# Patient Record
Sex: FEMALE | Race: WHITE | URBAN - METROPOLITAN AREA
[De-identification: names, ages, dates, MRNs, and addresses within clinical notes are randomized per-mention and may not be internally consistent; named-entity substitution may affect disease eponyms.]

---

## 2021-12-22 ENCOUNTER — OFFICE (OUTPATIENT)
Dept: URBAN - METROPOLITAN AREA CLINIC 79 | Facility: CLINIC | Age: 26
End: 2021-12-22

## 2021-12-22 VITALS
DIASTOLIC BLOOD PRESSURE: 64 MMHG | WEIGHT: 188 LBS | SYSTOLIC BLOOD PRESSURE: 92 MMHG | HEART RATE: 87 BPM | HEIGHT: 65 IN | TEMPERATURE: 97.4 F

## 2021-12-22 DIAGNOSIS — K92.1 MELENA: ICD-10-CM

## 2021-12-22 PROCEDURE — 00031: CPT | Performed by: INTERNAL MEDICINE

## 2021-12-22 PROCEDURE — 99204 OFFICE O/P NEW MOD 45 MIN: CPT | Performed by: INTERNAL MEDICINE

## 2022-01-10 ENCOUNTER — OFFICE (OUTPATIENT)
Dept: URBAN - METROPOLITAN AREA CLINIC 34 | Facility: CLINIC | Age: 27
End: 2022-01-10
Payer: COMMERCIAL

## 2022-01-10 DIAGNOSIS — Z11.59 ENCOUNTER FOR SCREENING FOR OTHER VIRAL DISEASES: ICD-10-CM

## 2022-01-10 PROCEDURE — G2023 SPECIMEN COLLECT COVID-19: HCPCS | Performed by: INTERNAL MEDICINE

## 2022-01-13 ENCOUNTER — OFFICE (OUTPATIENT)
Dept: URBAN - METROPOLITAN AREA CLINIC 98 | Facility: CLINIC | Age: 27
End: 2022-01-13

## 2022-01-13 VITALS
WEIGHT: 185 LBS | HEART RATE: 89 BPM | HEART RATE: 78 BPM | OXYGEN SATURATION: 100 % | DIASTOLIC BLOOD PRESSURE: 68 MMHG | HEART RATE: 73 BPM | SYSTOLIC BLOOD PRESSURE: 98 MMHG | RESPIRATION RATE: 20 BRPM | DIASTOLIC BLOOD PRESSURE: 62 MMHG | TEMPERATURE: 97.5 F | TEMPERATURE: 97.9 F | OXYGEN SATURATION: 98 % | SYSTOLIC BLOOD PRESSURE: 104 MMHG | DIASTOLIC BLOOD PRESSURE: 58 MMHG | RESPIRATION RATE: 14 BRPM | SYSTOLIC BLOOD PRESSURE: 109 MMHG | RESPIRATION RATE: 17 BRPM | RESPIRATION RATE: 16 BRPM | DIASTOLIC BLOOD PRESSURE: 76 MMHG | HEIGHT: 65 IN | OXYGEN SATURATION: 99 % | SYSTOLIC BLOOD PRESSURE: 107 MMHG | HEART RATE: 77 BPM | HEART RATE: 92 BPM | SYSTOLIC BLOOD PRESSURE: 115 MMHG | HEART RATE: 80 BPM | SYSTOLIC BLOOD PRESSURE: 122 MMHG | RESPIRATION RATE: 18 BRPM

## 2022-01-13 DIAGNOSIS — K60.1 CHRONIC ANAL FISSURE: ICD-10-CM

## 2022-01-13 DIAGNOSIS — K92.1 MELENA: ICD-10-CM

## 2022-01-13 DIAGNOSIS — K63.5 POLYP OF COLON: ICD-10-CM

## 2022-01-13 LAB
GI LOWER POLYPECTOMY EXCISION - SPECM 1 JAR(S): 1: (no result)
GI LOWER POLYPECTOMY EXCISION - SPECM 1 JAR(S): 1: PDF REPORT: (no result)

## 2022-01-24 ENCOUNTER — APPOINTMENT (RX ONLY)
Dept: URBAN - METROPOLITAN AREA CLINIC 371 | Facility: CLINIC | Age: 27
Setting detail: DERMATOLOGY
End: 2022-01-24

## 2022-01-24 DIAGNOSIS — Q819 OTHER SPECIFIED ANOMALIES OF SKIN: ICD-10-CM

## 2022-01-24 DIAGNOSIS — Q826 OTHER SPECIFIED ANOMALIES OF SKIN: ICD-10-CM

## 2022-01-24 DIAGNOSIS — L70.0 ACNE VULGARIS: ICD-10-CM | Status: INADEQUATELY CONTROLLED

## 2022-01-24 DIAGNOSIS — L40.8 OTHER PSORIASIS: ICD-10-CM

## 2022-01-24 DIAGNOSIS — Q828 OTHER SPECIFIED ANOMALIES OF SKIN: ICD-10-CM

## 2022-01-24 PROBLEM — L85.8 OTHER SPECIFIED EPIDERMAL THICKENING: Status: ACTIVE | Noted: 2022-01-24

## 2022-01-24 PROCEDURE — ? COUNSELING

## 2022-01-24 PROCEDURE — 99204 OFFICE O/P NEW MOD 45 MIN: CPT

## 2022-01-24 PROCEDURE — ? ORDER TESTS

## 2022-01-24 PROCEDURE — ? MEDICATION COUNSELING

## 2022-01-24 PROCEDURE — ? PRESCRIPTION

## 2022-01-24 PROCEDURE — ? TREATMENT REGIMEN

## 2022-01-24 RX ORDER — CICLOPIROX 10 MG/.96ML
SHAMPOO TOPICAL TIW
Qty: 120 | Refills: 3 | Status: ERX | COMMUNITY
Start: 2022-01-24

## 2022-01-24 RX ORDER — DAPSONE 75 MG/G
GEL TOPICAL
Qty: 90 | Refills: 5 | Status: ERX | COMMUNITY
Start: 2022-01-24

## 2022-01-24 RX ORDER — SPIRONOLACTONE 50 MG/1
TABLET, FILM COATED ORAL
Qty: 60 | Refills: 3 | Status: ACTIVE

## 2022-01-24 RX ORDER — HALOBETASOL PROPIONATE 0.5 MG/G
AEROSOL, FOAM TOPICAL
Qty: 50 | Refills: 2 | Status: ERX | COMMUNITY
Start: 2022-01-24

## 2022-01-24 RX ORDER — TRIFAROTENE 50 UG/G
CREAM TOPICAL
Qty: 45 | Refills: 3 | Status: ERX | COMMUNITY
Start: 2022-01-24

## 2022-01-24 RX ADMIN — HALOBETASOL PROPIONATE: 0.5 AEROSOL, FOAM TOPICAL at 00:00

## 2022-01-24 RX ADMIN — TRIFAROTENE: 50 CREAM TOPICAL at 00:00

## 2022-01-24 RX ADMIN — CICLOPIROX: 10 SHAMPOO TOPICAL at 00:00

## 2022-01-24 RX ADMIN — DAPSONE: 75 GEL TOPICAL at 00:00

## 2022-01-24 ASSESSMENT — LOCATION SIMPLE DESCRIPTION DERM
LOCATION SIMPLE: LEFT POSTERIOR UPPER ARM
LOCATION SIMPLE: LEFT CHEEK
LOCATION SIMPLE: RIGHT POSTERIOR UPPER ARM
LOCATION SIMPLE: SCALP
LOCATION SIMPLE: RIGHT CHEEK

## 2022-01-24 ASSESSMENT — LOCATION DETAILED DESCRIPTION DERM
LOCATION DETAILED: LEFT DISTAL POSTERIOR UPPER ARM
LOCATION DETAILED: RIGHT DISTAL POSTERIOR UPPER ARM
LOCATION DETAILED: RIGHT SUPERIOR PARIETAL SCALP
LOCATION DETAILED: LEFT INFERIOR CENTRAL MALAR CHEEK
LOCATION DETAILED: RIGHT INFERIOR CENTRAL MALAR CHEEK

## 2022-01-24 ASSESSMENT — LOCATION ZONE DERM
LOCATION ZONE: SCALP
LOCATION ZONE: FACE
LOCATION ZONE: ARM

## 2022-01-24 NOTE — PROCEDURE: MEDICATION COUNSELING
Discussion/Summary   Hi Sharona,      I tried to call you to review your blood work but your voice mail box was full. Overall everything looks good! Your electrolytes, cholesterol, thyroid, kidney and liver function are normal. Your vitamin D level was slightly low at 28.4, the normal range is  but I find people feel better when levels are >50. This wouldn't account for your difficulties with maintaining or losing weight BUT can contribute to lack of energy/fatigue. I would recommend 3 months of high dose supplementation with repeat blood test once you complete the course. I will send a prescription to your pharmacy. We will plan to repeat your vitamin D level 1-2 weeks after completing the 3 month course. Please call so I can order the lab to have it done prior to your return appointment.       Call with any other questions.       Sincerely,   Dr Sellers         Verified Results  COMP METABOLIC PANEL WITH LIPID PANEL (CPNL,LIPDPL) [id number] 08:50AM [facility]   [May 22, 2018 9:08AM [facility]]  tried to call patient but VM box full. Only abnormality was vitamin D, sent rx for high dose supplementation     Test Name Result Flag Reference   SODIUM 142 mmol/L  135-145   POTASSIUM 4.1 mmol/L  3.4-5.1   CHLORIDE 106 mmol/L     CARBON DIOXIDE 23 mmol/L  21-32   ANION GAP 17 mmol/L  10-20   GLUCOSE 72 mg/dl  65-99   BUN 13 mg/dl  6-20   CREATININE 0.93 mg/dl  0.51-0.95   GFR EST.AFRICAN AMER >90     eGFR results = or >90 mL/min/1.73m2 = Normal kidney function.   GFR EST.NONAFRI AMER 82     eGFR 60 - 89 mL/min/1.73m2 = Mild decrease in kidney function.   BUN/CREATININE RATIO 14  7-25   BILIRUBIN TOTAL 1.1 mg/dl H 0.2-1.0   GOT/AST 26 Units/L  <38   ALKALINE PHOSPHATASE 40 Units/L L    Low Alkaline Phosphatase results may indicate hypophosphatasia, malnutrition, hypothyroidism, or other disease states. Correlate with clinical symptoms.   ALBUMIN 3.9 g/dl  3.6-5.1   TOTAL PROTEIN 6.7 g/dl   6.4-8.2   GLOBULIN (CALCULATED) 2.8 g/dl  2.0-4.0   A/G RATIO 1.4  1.0-2.4   CALCIUM 9.1 mg/dl  8.4-10.2   GPT/ALT 27 Units/L  <79   FASTING STATUS UNKNOWN hrs     CHOLESTEROL 165 mg/dl  <200   Desirable            <200  Borderline High      200 to 239  High                 >=240   HDL CHOLESTEROL 77 mg/dl  >49   Low            <40  Borderline Low 40 to 49  Near Optimal   50 to 59  Optimal        >=60   TRIGLYCERIDES 119 mg/dl  <150   Normal                   <150  Borderline High          150 to 199  High                     200 to 499  Very High                >=500   LDL CHOLESTEROL (CALCULATED) 64 mg/dl  <130   OPTIMAL               <100  NEAR OPTIMAL          100-129  BORDERLINE HIGH       130-159  HIGH                  160-189  VERY HIGH             >=190   NON-HDL CHOLESTEROL 88 mg/dl     Therapeutic Target:  CHD and risk equivalents <130  Multiple risk factors    <160  0 to 1 risk factors      <190   CHOLESTEROL/HDL RATIO 2.1  <4.5   FASTING STATUS UNKNOWN hrs       VITAMIN D,25 HYDROXY [id number] 08:50AM [facility]   [May 22, 2018 9:08AM [facility]]  tried to call patient but VM box full. Only abnormality was vitamin D, sent rx for high dose supplementation     Test Name Result Flag Reference   VIT D,25 HYDROXY 28.4 ng/ml L 30.0-100.0   <20  ng/mL=Vitamin D deficiency  20-29  ng/mL=Vitamin D insufficiency   ng/mL=Optimal Vitamin D  >150 ng/mL=Possible toxicity     TSH WITH REFLEX [id number] 08:50AM [facility]   [May 22, 2018 9:08AM [facility]]  tried to call patient but VM box full. Only abnormality was vitamin D, sent rx for high dose supplementation     Test Name Result Flag Reference   TSH 1.187 mcUnits/mL  0.350-5.000   Findings most consistent with euthyroid state, no additional testing suggested. TSH may be normal in patients with thyroid dysfunction and pituitary disease. Clinical correlation recommended.  (Reflex TSH algorithm is not recommended in hospitalized  patients. A variety of drugs, as well as serious acute and chronic illnesses may alter thyroid function tests. Commonly implicated drugs include glucocorticoids, dopamine, carbamazepine, iodine, amiodarone, lithium and heparin.)        Cosentyx Counseling:  I discussed with the patient the risks of Cosentyx including but not limited to worsening of Crohn's disease, immunosuppression, allergic reactions and infections.  The patient understands that monitoring is required including a PPD at baseline and must alert us or the primary physician if symptoms of infection or other concerning signs are noted.

## 2022-01-24 NOTE — PROCEDURE: MEDICATION COUNSELING
Authorization received.     Cephalexin Pregnancy And Lactation Text: This medication is Pregnancy Category B and considered safe during pregnancy.  It is also excreted in breast milk but can be used safely for shorter doses.

## 2022-01-24 NOTE — PROCEDURE: TREATMENT REGIMEN
Detail Level: Zone
Initiate Treatment: Wash arms with Cerave SA cleanser in the shower \\nApply Cerave renewing lotion.
Initiate Treatment: AM: \\n1. Wash face daily with CeraVe Hydrating Cleanser or current cleanser \\n2. Pat skin dry \\n3. Apply a thin layer of aczone to the entire face each morning. \\n4. Apply a good moisturizer with SPF daily such as CeraVe AM \\n\\nOrally: take one tablet of spironolactone 50 mg x 2 weeks then increase to two tablets daily \\n\\nPM: \\n1. Wash face with CeraVe Hydrating Cleanser or current cleanser \\n2. Pat skin dry \\n3. Apply a thin layer (pea sized amount) of Aklief rub in well. Apply every other night then work up to nightly as tolerated. \\n4. These medications can cause dryness, irritation, and peeling- so be sure to layer a good moisturizer over top such as CeraVe PM or Cetaphil.
Initiate Treatment: Wash scalp with loprox shampoo, leave on the scalp for 3-5 min then rinse repeat every 3rd day. \\nApply lexette foam to the scalp nightly for 4 weeks

## 2022-04-19 ENCOUNTER — OFFICE (OUTPATIENT)
Dept: URBAN - METROPOLITAN AREA CLINIC 102 | Facility: CLINIC | Age: 27
End: 2022-04-19

## 2022-04-19 VITALS
WEIGHT: 188 LBS | HEART RATE: 98 BPM | HEIGHT: 65 IN | TEMPERATURE: 97.5 F | SYSTOLIC BLOOD PRESSURE: 128 MMHG | DIASTOLIC BLOOD PRESSURE: 77 MMHG

## 2022-04-19 DIAGNOSIS — K60.1 CHRONIC ANAL FISSURE: ICD-10-CM

## 2022-04-19 PROCEDURE — 99214 OFFICE O/P EST MOD 30 MIN: CPT | Performed by: INTERNAL MEDICINE

## 2022-04-19 NOTE — SERVICEHPINOTES
ONUR BENSON   is a   27  female who complains of rectal itching and rectal pain for several months. She had colonoscopy with Dr. Moreland who noted an anal fissure and put her on nifedipine/lidocaine.  This did not really help at all. She continues to have rectal pain during defecation, like knives or razor blades coming out even though stool is soft. She notes continued bleeding and itching. She denies any other GI symptoms. BMs are soft. She has been using sitz baths and stool softeners. She went to the ED the other day and was noted to have healing hemorrhoid and healing fissure as per ED physician (according to patient).

## 2023-09-27 ENCOUNTER — TELEHEALTH PROVIDED OTHER THAN IN PATIENT'S HOME (OUTPATIENT)
Dept: URBAN - METROPOLITAN AREA TELEHEALTH 3 | Facility: TELEHEALTH | Age: 28
End: 2023-09-27

## 2023-09-27 VITALS — WEIGHT: 200 LBS | HEIGHT: 65 IN

## 2023-09-27 DIAGNOSIS — E66.9 OBESITY, UNSPECIFIED: ICD-10-CM

## 2023-09-27 DIAGNOSIS — K30 FUNCTIONAL DYSPEPSIA: ICD-10-CM

## 2023-09-27 DIAGNOSIS — R12 HEARTBURN: ICD-10-CM

## 2023-09-27 DIAGNOSIS — R07.89 OTHER CHEST PAIN: ICD-10-CM

## 2023-09-27 DIAGNOSIS — R13.10 DYSPHAGIA, UNSPECIFIED: ICD-10-CM

## 2023-09-27 PROCEDURE — 99214 OFFICE O/P EST MOD 30 MIN: CPT | Mod: 95 | Performed by: PHYSICIAN ASSISTANT

## 2023-09-27 NOTE — SERVICEHPINOTES
PATIENT VERIFIED BY DATE OF BIRTH AND NAME. Patient has been consented for this telecommunication visit.   Pt is here to discuss GERD. She has experienced GERD for at least six months. Symptoms are daily. At first, pepcid helped but stopped working. She took Omeprazole OTC and likely prescription Omeprazole--took the prescription for around one month and the OTC PPI for about 3 months. Omeprazole didn't help. Now only on famotidine--one per day--main symptoms are heartburn, reflux, and burning in the chest. Burning chest pain used to cease with the famotidine but now doesn't. No chest pain or SOB upon climbing one flight of stairs.  
br
br
She had to go to the hospital recently for burning chest pain and dysphagia to both solids and liquids. She continues to have dysphagia to both solids and liquids with associated burning in the esophagus. + nausea but no vomiting. If she doesn't have pepcid, will regurgitate food up. No stomach pain, hematemesis, unexplained wt loss, anorexia, and no early satiety. No prior EGD. No abuse of NSAIDS. Trying to slightly watch her diet. Does drink caffeine. No tobacco use. Drinks ETOH rarely. No other GI related complaints today. ROS as per HPI and otherwise unremarkable.

## 2023-09-27 NOTE — SERVICENOTES
Patient's visit was conducted through [employer] video telecommunication. Patient consented before the start of visit as to understanding of privacy concerns, possible technological failure, and their responsibility of carrying out instructions of plan.